# Patient Record
Sex: MALE | Race: WHITE | NOT HISPANIC OR LATINO | URBAN - METROPOLITAN AREA
[De-identification: names, ages, dates, MRNs, and addresses within clinical notes are randomized per-mention and may not be internally consistent; named-entity substitution may affect disease eponyms.]

---

## 2024-05-03 DIAGNOSIS — Z00.00 GENERAL MEDICAL EXAM: Primary | ICD-10-CM

## 2024-07-08 ENCOUNTER — OFFICE VISIT (OUTPATIENT)
Dept: INTERNAL MEDICINE | Facility: CLINIC | Age: 34
End: 2024-07-08
Payer: COMMERCIAL

## 2024-07-08 DIAGNOSIS — Z00.00 GENERAL MEDICAL EXAM: ICD-10-CM

## 2024-07-08 LAB
ALBUMIN SERPL-MCNC: 4.4 G/DL (ref 3.5–5)
ALBUMIN/GLOB SERPL: 1.8 RATIO (ref 1.1–2)
ALP SERPL-CCNC: 62 UNIT/L (ref 40–150)
ALT SERPL-CCNC: 24 UNIT/L (ref 0–55)
ANION GAP SERPL CALC-SCNC: 7 MEQ/L
AST SERPL-CCNC: 21 UNIT/L (ref 5–34)
BASOPHILS # BLD AUTO: 0.04 X10(3)/MCL
BASOPHILS NFR BLD AUTO: 0.7 %
BILIRUB SERPL-MCNC: 0.6 MG/DL
BUN SERPL-MCNC: 11.5 MG/DL (ref 8.9–20.6)
CALCIUM SERPL-MCNC: 9.4 MG/DL (ref 8.4–10.2)
CHLORIDE SERPL-SCNC: 106 MMOL/L (ref 98–107)
CO2 SERPL-SCNC: 27 MMOL/L (ref 22–29)
CREAT SERPL-MCNC: 0.88 MG/DL (ref 0.73–1.18)
CREAT/UREA NIT SERPL: 13
EOSINOPHIL # BLD AUTO: 0.05 X10(3)/MCL (ref 0–0.9)
EOSINOPHIL NFR BLD AUTO: 0.9 %
ERYTHROCYTE [DISTWIDTH] IN BLOOD BY AUTOMATED COUNT: 13 % (ref 11.5–17)
EST. AVERAGE GLUCOSE BLD GHB EST-MCNC: 102.5 MG/DL
GFR SERPLBLD CREATININE-BSD FMLA CKD-EPI: >60 ML/MIN/1.73/M2
GLOBULIN SER-MCNC: 2.5 GM/DL (ref 2.4–3.5)
GLUCOSE SERPL-MCNC: 96 MG/DL (ref 74–100)
GLUCOSE SERPL-MCNC: NORMAL MG/DL (ref 70–110)
HBA1C MFR BLD: 5.2 %
HCT VFR BLD AUTO: 45.2 % (ref 42–52)
HGB BLD-MCNC: 15.1 G/DL (ref 14–18)
IMM GRANULOCYTES # BLD AUTO: 0.04 X10(3)/MCL (ref 0–0.04)
IMM GRANULOCYTES NFR BLD AUTO: 0.7 %
LYMPHOCYTES # BLD AUTO: 1.94 X10(3)/MCL (ref 0.6–4.6)
LYMPHOCYTES NFR BLD AUTO: 33.6 %
MCH RBC QN AUTO: 28.2 PG (ref 27–31)
MCHC RBC AUTO-ENTMCNC: 33.4 G/DL (ref 33–36)
MCV RBC AUTO: 84.5 FL (ref 80–94)
MONOCYTES # BLD AUTO: 0.35 X10(3)/MCL (ref 0.1–1.3)
MONOCYTES NFR BLD AUTO: 6.1 %
NEUTROPHILS # BLD AUTO: 3.36 X10(3)/MCL (ref 2.1–9.2)
NEUTROPHILS NFR BLD AUTO: 58 %
NRBC BLD AUTO-RTO: 0 %
PLATELET # BLD AUTO: 212 X10(3)/MCL (ref 130–400)
PMV BLD AUTO: 11.9 FL (ref 7.4–10.4)
POC CHOLESTEROL, HDL: 42
POC CHOLESTEROL, LDL: 119
POC CHOLESTEROL, TOTAL: 181 MG/DL
POC GLUCOSE FASTING: 95
POC TOTAL CHOLESTEROL / HDL RATIO: 4.3
POC TRIGLYCERIDES: 110
POTASSIUM SERPL-SCNC: 4 MMOL/L (ref 3.5–5.1)
PROT SERPL-MCNC: 6.9 GM/DL (ref 6.4–8.3)
PSA SERPL-MCNC: 0.86 NG/ML
RBC # BLD AUTO: 5.35 X10(6)/MCL (ref 4.7–6.1)
SODIUM SERPL-SCNC: 140 MMOL/L (ref 136–145)
WBC # BLD AUTO: 5.78 X10(3)/MCL (ref 4.5–11.5)

## 2024-07-08 PROCEDURE — 85025 COMPLETE CBC W/AUTO DIFF WBC: CPT | Performed by: STUDENT IN AN ORGANIZED HEALTH CARE EDUCATION/TRAINING PROGRAM

## 2024-07-08 PROCEDURE — 80053 COMPREHEN METABOLIC PANEL: CPT | Performed by: STUDENT IN AN ORGANIZED HEALTH CARE EDUCATION/TRAINING PROGRAM

## 2024-07-08 PROCEDURE — 84153 ASSAY OF PSA TOTAL: CPT | Performed by: STUDENT IN AN ORGANIZED HEALTH CARE EDUCATION/TRAINING PROGRAM

## 2024-07-08 PROCEDURE — 80061 LIPID PANEL: CPT | Mod: QW,,, | Performed by: STUDENT IN AN ORGANIZED HEALTH CARE EDUCATION/TRAINING PROGRAM

## 2024-07-08 PROCEDURE — 99499 UNLISTED E&M SERVICE: CPT | Mod: ,,, | Performed by: STUDENT IN AN ORGANIZED HEALTH CARE EDUCATION/TRAINING PROGRAM

## 2024-07-08 PROCEDURE — 82947 ASSAY GLUCOSE BLOOD QUANT: CPT | Mod: QW,,, | Performed by: STUDENT IN AN ORGANIZED HEALTH CARE EDUCATION/TRAINING PROGRAM

## 2024-07-08 PROCEDURE — 36415 COLL VENOUS BLD VENIPUNCTURE: CPT | Mod: ,,, | Performed by: STUDENT IN AN ORGANIZED HEALTH CARE EDUCATION/TRAINING PROGRAM

## 2024-07-08 PROCEDURE — 99172 OCULAR FUNCTION SCREEN: CPT | Mod: ,,, | Performed by: STUDENT IN AN ORGANIZED HEALTH CARE EDUCATION/TRAINING PROGRAM

## 2024-07-08 PROCEDURE — 0358T BIA WHOLE BODY: CPT | Mod: ,,, | Performed by: STUDENT IN AN ORGANIZED HEALTH CARE EDUCATION/TRAINING PROGRAM

## 2024-07-08 PROCEDURE — 83036 HEMOGLOBIN GLYCOSYLATED A1C: CPT | Performed by: STUDENT IN AN ORGANIZED HEALTH CARE EDUCATION/TRAINING PROGRAM

## 2024-07-08 NOTE — PROGRESS NOTES
Executive Health  Felice Mock MD      [facility] HEALTH & WELLNESS SCREENING     A one-time consultation was provided to you today. The following information was reviewed in detail:  -Health Risk Assessment (HRA)  -Vital Signs  -Lipid Panel  -InBody Assessment  -Vision Screening  -Diet, exercise, and general health recommendations    CURRENT MEDICAL HISTORY     Kel Deshpande is a 34 y.o. male who is here today for a health screen.     The patient reports no recent changes to their medical history.    IN OFFICE TESTING     Results for the HbA1c, PSA (or TSH), CBC and CMP tests that were drawn during the health screening will be routed for physician review. Outreach will be performed through the [facility] patient portal or by telephone following the release of this clinic note.     Cholesterol results were reviewed during today's visit.    In Body Assessment   Impression  Abnormal   - Body Fat elevated at 32.7%   - Visceral fat is elevated significantly for age at 150     Plan Exercise Prescription         Type: Aerobic: walking, stationary cycling, aquatic exercise, running         Frequency: 3-5 sessions/week         Intensity: Moderate (RPE: 10-13)          Time: 30 minutes (150 minutes/week)              Plus         Type: Strength/Resistance Training: exercises using resistance bands, weight machines, handheld  weights or body weight         Frequency: 2-3 session/week               Plus         Type: Flexibility training: pre/post workout stretching, yoga, rachel chi         Frequency: 5-7 sessions/week         Time: 5-10 minutes       Plan Diet Recommendations:  Follow a heart healthy diet such as the Mediterranean-style diet.   Eat an overall healthy dietary pattern that emphasizes:   -a wide variety of fruits and vegetables   -whole grains and products made up mostly of whole grains   -healthy sources of protein (mostly plants such as legumes and nuts; fish and seafood; low fat or  nonfat dairy; and, if you eat meat and poultry, ensuring it is lean and unprocessed)   -liquid non-tropical vegetable oils   -minimally processed foods   -minimized intake of added sugars   -food prepared with little or no salt   -limited or preferably no alcohol intake     FINAL RECOMMENDATIONS     Based on the above results, the following recommendations were made:     Health Interventions - Focus on weight loss. Losing fat will also help lower your visceral fat (fat in and around your organs) which was high. Calorie restriction is the most effective way to lose weight.   - Stop smoking. Reach out to your primary care physician for treatment options or consider joining a smoking cessation program.        Health Maintenance Cancer Screening: All recommended cancer screening exams are up to date.  Continue yearly wellness visits as planned.     Immunizations: Up to date.  Your Tdap (tetanus) is due by March 2025. I recommend you obtain this at your pharmacy soon.    Heart Health: Follow a heart healthy diet (see details above), maintain a healthy blood pressure (<140/80) and weight (BMI 25-29.9). Follow a regular aerobic exercise routine (see exercise prescription above).       Early identification and prevention are the keys to maintaining overall health and prevention of chronic diseases. For this reason, I recommend yearly physical examinations through this program and/or with your primary care physician.     It was a pleasure taking care of you, Kel Deshpande. Thank you for using the [facility].    Felice Mock MD          [facility]  [address]   [place] [id number]

## 2024-07-09 VITALS
DIASTOLIC BLOOD PRESSURE: 76 MMHG | WEIGHT: 227 LBS | SYSTOLIC BLOOD PRESSURE: 122 MMHG | HEIGHT: 69 IN | HEART RATE: 84 BPM | BODY MASS INDEX: 33.62 KG/M2

## 2025-02-19 DIAGNOSIS — Z00.00 GENERAL MEDICAL EXAM: Primary | ICD-10-CM

## 2025-05-05 ENCOUNTER — OFFICE VISIT (OUTPATIENT)
Dept: INTERNAL MEDICINE | Facility: CLINIC | Age: 35
End: 2025-05-05
Payer: COMMERCIAL

## 2025-05-05 ENCOUNTER — RESULTS FOLLOW-UP (OUTPATIENT)
Dept: INTERNAL MEDICINE | Facility: CLINIC | Age: 35
End: 2025-05-05

## 2025-05-05 VITALS
WEIGHT: 222 LBS | TEMPERATURE: 98 F | OXYGEN SATURATION: 98 % | SYSTOLIC BLOOD PRESSURE: 142 MMHG | HEIGHT: 69 IN | BODY MASS INDEX: 32.88 KG/M2 | DIASTOLIC BLOOD PRESSURE: 82 MMHG

## 2025-05-05 DIAGNOSIS — Z00.00 GENERAL MEDICAL EXAM: ICD-10-CM

## 2025-05-05 LAB
ALBUMIN SERPL-MCNC: 4.5 G/DL (ref 3.5–5)
ALBUMIN/GLOB SERPL: 1.5 RATIO (ref 1.1–2)
ALP SERPL-CCNC: 68 UNIT/L (ref 40–150)
ALT SERPL-CCNC: 25 UNIT/L (ref 0–55)
ANION GAP SERPL CALC-SCNC: 10 MEQ/L
AST SERPL-CCNC: 20 UNIT/L (ref 11–45)
BASOPHILS # BLD AUTO: 0.03 X10(3)/MCL
BASOPHILS NFR BLD AUTO: 0.6 %
BILIRUB SERPL-MCNC: 0.5 MG/DL
BUN SERPL-MCNC: 12 MG/DL (ref 8.9–20.6)
CALCIUM SERPL-MCNC: 9.4 MG/DL (ref 8.4–10.2)
CHLORIDE SERPL-SCNC: 107 MMOL/L (ref 98–107)
CHOLEST SERPL-MCNC: 184 MG/DL
CHOLEST/HDLC SERPL: 3 {RATIO} (ref 0–5)
CO2 SERPL-SCNC: 26 MMOL/L (ref 22–29)
CREAT SERPL-MCNC: 0.81 MG/DL (ref 0.72–1.25)
CREAT/UREA NIT SERPL: 15
EOSINOPHIL # BLD AUTO: 0.03 X10(3)/MCL (ref 0–0.9)
EOSINOPHIL NFR BLD AUTO: 0.6 %
ERYTHROCYTE [DISTWIDTH] IN BLOOD BY AUTOMATED COUNT: 13.2 % (ref 11.5–17)
EST. AVERAGE GLUCOSE BLD GHB EST-MCNC: 99.7 MG/DL
GFR SERPLBLD CREATININE-BSD FMLA CKD-EPI: >60 ML/MIN/1.73/M2
GLOBULIN SER-MCNC: 3 GM/DL (ref 2.4–3.5)
GLUCOSE SERPL-MCNC: 82 MG/DL (ref 74–100)
GLUCOSE SERPL-MCNC: NORMAL MG/DL (ref 70–110)
HBA1C MFR BLD: 5.1 %
HCT VFR BLD AUTO: 46.7 % (ref 42–52)
HDLC SERPL-MCNC: 53 MG/DL (ref 35–60)
HGB BLD-MCNC: 15.5 G/DL (ref 14–18)
IMM GRANULOCYTES # BLD AUTO: 0.02 X10(3)/MCL (ref 0–0.04)
IMM GRANULOCYTES NFR BLD AUTO: 0.4 %
LDLC SERPL CALC-MCNC: 121 MG/DL (ref 50–140)
LYMPHOCYTES # BLD AUTO: 1.58 X10(3)/MCL (ref 0.6–4.6)
LYMPHOCYTES NFR BLD AUTO: 29.8 %
MCH RBC QN AUTO: 28.7 PG (ref 27–31)
MCHC RBC AUTO-ENTMCNC: 33.2 G/DL (ref 33–36)
MCV RBC AUTO: 86.5 FL (ref 80–94)
MONOCYTES # BLD AUTO: 0.31 X10(3)/MCL (ref 0.1–1.3)
MONOCYTES NFR BLD AUTO: 5.8 %
NEUTROPHILS # BLD AUTO: 3.34 X10(3)/MCL (ref 2.1–9.2)
NEUTROPHILS NFR BLD AUTO: 62.8 %
NRBC BLD AUTO-RTO: 0 %
PLATELET # BLD AUTO: 219 X10(3)/MCL (ref 130–400)
PMV BLD AUTO: 12.3 FL (ref 7.4–10.4)
POC CHOLESTEROL, HDL: 41
POC CHOLESTEROL, LDL: NORMAL
POC CHOLESTEROL, TOTAL: 169 MG/DL
POC GLUCOSE FASTING: 78
POC TOTAL CHOLESTEROL / HDL RATIO: 4.1
POC TRIGLYCERIDES: <45
POTASSIUM SERPL-SCNC: 4.2 MMOL/L (ref 3.5–5.1)
PROT SERPL-MCNC: 7.5 GM/DL (ref 6.4–8.3)
PSA SERPL-MCNC: 1.04 NG/ML
RBC # BLD AUTO: 5.4 X10(6)/MCL (ref 4.7–6.1)
SODIUM SERPL-SCNC: 143 MMOL/L (ref 136–145)
TRIGL SERPL-MCNC: 48 MG/DL (ref 34–140)
VLDLC SERPL CALC-MCNC: 10 MG/DL
WBC # BLD AUTO: 5.31 X10(3)/MCL (ref 4.5–11.5)

## 2025-05-05 PROCEDURE — 84153 ASSAY OF PSA TOTAL: CPT | Performed by: FAMILY MEDICINE

## 2025-05-05 PROCEDURE — 99499 UNLISTED E&M SERVICE: CPT | Mod: ,,, | Performed by: FAMILY MEDICINE

## 2025-05-05 PROCEDURE — 82947 ASSAY GLUCOSE BLOOD QUANT: CPT | Mod: QW,,, | Performed by: FAMILY MEDICINE

## 2025-05-05 PROCEDURE — 80061 LIPID PANEL: CPT | Mod: QW,,, | Performed by: FAMILY MEDICINE

## 2025-05-05 PROCEDURE — 0358T BIA WHOLE BODY: CPT | Mod: ,,, | Performed by: FAMILY MEDICINE

## 2025-05-05 PROCEDURE — 85025 COMPLETE CBC W/AUTO DIFF WBC: CPT | Performed by: FAMILY MEDICINE

## 2025-05-05 PROCEDURE — 83036 HEMOGLOBIN GLYCOSYLATED A1C: CPT | Performed by: FAMILY MEDICINE

## 2025-05-05 PROCEDURE — 80053 COMPREHEN METABOLIC PANEL: CPT | Performed by: FAMILY MEDICINE

## 2025-05-05 PROCEDURE — 99172 OCULAR FUNCTION SCREEN: CPT | Mod: ,,, | Performed by: FAMILY MEDICINE

## 2025-05-05 PROCEDURE — 80061 LIPID PANEL: CPT | Performed by: FAMILY MEDICINE

## 2025-05-05 NOTE — PROGRESS NOTES
"    Executive Health  Marisol Ellington MD      [facility] HEALTH & WELLNESS SCREENING     A one-time consultation was provided to you today. The following information was reviewed in detail:  -Health Risk Assessment (HRA)  -Vital Signs  -Lipid Panel  -InBody Assessment  -Vision Screening  -Diet, exercise, and general health recommendations      Kel Deshpande is a 34 y.o. male who is here today for a health screen.     CURRENT MEDICAL HISTORY   Kel reports no recent changes to his medical history. He has ADHD and takes medications.     He states he smokes 1-2 cigarettes/day over the last 8 years. He is not ready to quit at this time.     IN OFFICE TESTING     BP (!) 142/82 (BP Location: Right arm, Patient Position: Sitting)   Temp 98.3 °F (36.8 °C) (Oral)   Ht 5' 9" (1.753 m)   Wt 100.7 kg (222 lb)   SpO2 98%   BMI 32.78 kg/m²     274}  Lab Results   Component Value Date    CHOL 169 05/05/2025    POCLDL n/a 05/05/2025    POCHDL 41 05/05/2025    POCTCHDL 4.1 05/05/2025    POCTRIG <45 05/05/2025    GLUFAST 78 05/05/2025     Vision Screening    Right eye Left eye Both eyes   Without correction   20/20   With correction      Comments: Near Acuity 20/20  Far Acuity 20/20       In Body Assessment   Impression  Abnormal  Recommendations: 20lb fat mass loss over the next year   Plan Exercise Prescription         Type: Aerobic: walking, stationary cycling, aquatic exercise, running         Frequency: 3-5 sessions/week         Intensity: Moderate (RPE: 10-13)          Time: 30 minutes (150 minutes/week)              Plus         Type: Strength/Resistance Training: exercises using resistance bands, weight machines, handheld  weights or body weight         Frequency: 2-3 session/week               Plus         Type: Flexibility training: pre/post workout stretching, yoga, rachel chi         Frequency: 5-7 sessions/week         Time: 5-10 minutes       Plan Diet Recommendations:  Follow a heart " healthy diet such as the Mediterranean-style diet.   Eat an overall healthy dietary pattern that emphasizes:   -a wide variety of fruits and vegetables   -whole grains and products made up mostly of whole grains   -healthy sources of protein (mostly plants such as legumes and nuts; fish and seafood; low fat or nonfat dairy; and, if you eat meat and poultry, ensuring it is lean and unprocessed)   -liquid non-tropical vegetable oils   -minimally processed foods   -minimized intake of added sugars   -food prepared with little or no salt   -limited or preferably no alcohol intake     FINAL RECOMMENDATIONS     Based on the patient's HRA, Lipid Panel, Vitals, Vision Screening and InBody Assessment, the following recommendations were made:     Health Interventions Monitor your blood pressure at home. Use a mid range over the counter blood pressure arm cuff. Take your blood pressure daily or every other day both in the morning and late afternoons. Report consistently elevated blood pressures to your primary care physician. These would be blood pressure readings where the top number is above 140 and/or the bottom number is above 90.   Stop smoking. Try over the counter medications and/or speak to your doctor about how best to quit.          Health Maintenance Cancer Screening: All recommended cancer screening exams are up to date.  Continue yearly wellness visits as planned.     Immunizations: The following immunizations are recommended for your age group:  Tetanus vaccine, Influenza vaccine, Covid vaccine, and Hepatitis B vaccine    Heart Health: Follow a heart healthy diet (see details above), maintain a healthy blood pressure (<140/80) and weight (BMI 25-29.9). Follow a regular aerobic exercise routine (see exercise prescription above).     Results for the HbA1c, PSA, CBC and Complete Metabolic Panel tests that were drawn during the health screening will be routed for physician review. Outreach will be performed through  the [facility] patient portal or by telephone following the release of this clinic note.     Early identification and prevention are the keys to maintaining overall health and prevention of chronic diseases. For this reason, I recommend yearly physical examinations through this program and/or with your primary care physician.       It was a pleasure taking care of you, Kel. Thank you for using the [facility].            [facility]  [address]   [place], [place]  [address]